# Patient Record
Sex: MALE | Race: BLACK OR AFRICAN AMERICAN
[De-identification: names, ages, dates, MRNs, and addresses within clinical notes are randomized per-mention and may not be internally consistent; named-entity substitution may affect disease eponyms.]

---

## 2018-08-29 ENCOUNTER — HOSPITAL ENCOUNTER (EMERGENCY)
Dept: HOSPITAL 62 - ER | Age: 23
Discharge: HOME | End: 2018-08-29
Payer: SELF-PAY

## 2018-08-29 VITALS — DIASTOLIC BLOOD PRESSURE: 46 MMHG | SYSTOLIC BLOOD PRESSURE: 102 MMHG

## 2018-08-29 DIAGNOSIS — R50.9: ICD-10-CM
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
## 2018-08-29 DIAGNOSIS — J02.9: Primary | ICD-10-CM

## 2018-08-29 DIAGNOSIS — R22.0: ICD-10-CM

## 2018-08-29 DIAGNOSIS — T78.40XA: ICD-10-CM

## 2018-08-29 PROCEDURE — 96375 TX/PRO/DX INJ NEW DRUG ADDON: CPT

## 2018-08-29 PROCEDURE — S0028 INJECTION, FAMOTIDINE, 20 MG: HCPCS

## 2018-08-29 PROCEDURE — 99283 EMERGENCY DEPT VISIT LOW MDM: CPT

## 2018-08-29 PROCEDURE — 96372 THER/PROPH/DIAG INJ SC/IM: CPT

## 2018-08-29 PROCEDURE — 96361 HYDRATE IV INFUSION ADD-ON: CPT

## 2018-08-29 PROCEDURE — 96374 THER/PROPH/DIAG INJ IV PUSH: CPT

## 2018-08-29 PROCEDURE — 87077 CULTURE AEROBIC IDENTIFY: CPT

## 2018-08-29 PROCEDURE — 87880 STREP A ASSAY W/OPTIC: CPT

## 2018-08-29 PROCEDURE — 87070 CULTURE OTHR SPECIMN AEROBIC: CPT

## 2018-08-29 NOTE — ER DOCUMENT REPORT
HPI





- HPI


Patient complains to provider of: Allergic reaction


Onset: Other - 3 days


Onset/Duration: Persistent


Quality of pain: No pain


Pain Level: Denies


Context: 





Patient states that he developed skin rash 3 days ago and gradually started to 

worsen and today he woke up with facial swelling that has continued throughout 

the day.  Patient complains of periorbital swelling and lip swelling.  Patient 

does complain of sore throat.  Patient denies any difficulty breathing or 

swallowing.  Patient denies any swelling to the tongue or throat.  Patient 

denies any new foods medications or detergents.


Associated Symptoms: Sore throat, Other - Facial swelling, skin rash.  denies: 

Nonproductive cough, Productive cough


Exacerbated by: Denies


Relieved by: Denies


Similar symptoms previously: No


Recently seen / treated by doctor: No





- ROS


ROS below otherwise negative: Yes


Systems Reviewed and Negative: Yes All other systems reviewed and negative





- CONSTITUTIONAL


Constitutional: REPORTS: Fever





- EENT


EENT: REPORTS: Sore Throat





- GASTROINTESTINAL


Gastrointestinal: DENIES: Nausea, Patient vomiting





- DERM


Skin Color: Normal


Skin Problems: Rash





Past Medical History





- General


Information source: Patient





- Social History


Smoking Status: Never Smoker


Frequency of alcohol use: None


Drug Abuse: None


Occupation: Call center


Family History: Reviewed & Not Pertinent





- Medical History


Medical History: Negative


Surgical Hx: Negative





Vertical Provider Document





- CONSTITUTIONAL


Agree With Documented VS: Yes


Exam Limitations: No Limitations


General Appearance: WD/WN, No Apparent Distress





- INFECTION CONTROL


TRAVEL OUTSIDE OF THE [place] IN LAST 30 DAYS: No





- HEENT


HEENT: Atraumatic, Normocephalic


Notes: 





Periorbital edema with trace swelling to lips.  No swelling to tongue or 

posterior pharynx, no potential airway compromise.  Patient managing oral 

secretions.





- NECK


Neck: Normal Inspection, Supple





- RESPIRATORY


Respiratory: Breath Sounds Normal, No Respiratory Distress





- CARDIOVASCULAR


Cardiovascular: Regular Rate, Regular Rhythm, No Murmur





- GI/ABDOMEN


Gastrointestinal: Abdomen Soft, Abdomen Non-Tender, No Organomegaly





- MUSCULOSKELETAL/EXTREMETIES


Musculoskeletal/Extremeties: MAEW, FROM





- NEURO


Level of Consciousness: Awake, Alert, Appropriate


Motor/Sensory: No Motor Deficit





- DERM


Integumentary: Warm, Dry, Rash - Urticarial rash to face trunk and extremities





Course





- Re-evaluation


Re-evalutation: 





08/29/18 16:53


Patient with subtle decrease in swelling to the upper lip and periorbital area.

  Patient states he feels as though swelling is improved at this time.  No 

respiratory distress.  No airway compromise.


08/29/18 19:25


No additional facial swelling, no potential airway compromise.  Patient states 

he is feeling much better.  Good return precautions given.





- Vital Signs


Vital signs: 


 











Temp Pulse Resp BP Pulse Ox


 


 100.6 F H  101 H  14   120/57 L  96 


 


 08/29/18 14:52  08/29/18 14:52  08/29/18 14:52  08/29/18 14:52  08/29/18 14:52














Discharge





- Discharge


Clinical Impression: 


 Sore throat





Allergic reaction


Qualifiers:


 Encounter type: initial encounter Qualified Code(s): T78.40XA - Allergy, 

unspecified, initial encounter





Fever


Qualifiers:


 Fever type: unspecified Qualified Code(s): R50.9 - Fever, unspecified





Condition: Stable


Disposition: HOME, SELF-CARE


Instructions:  Fever ([facility]), Sore Throat ([facility])


Additional Instructions: 


Return immediately for any new or worsening symptoms





Followup with your primary care provider, call tomorrow to make a followup 

appointment





Throat culture is pending, we will call if you need any different treatment





ACUTE ALLERGIC REACTION:





     Your symptoms are due to an allergic reaction.  Allergy can cause hives, 

swelling of the hands, feet, and face, hoarseness, and difficulty swallowing or 

breathing.  It may be due to exposure to medication, animal dander, foods, 

infection, or insect bites. Medication is a common cause, even when prior use 

of this same medication caused no problems.


     Acute treatment may include adrenalin and antihistamines. Usually, the 

specific allergic agent can't be identified unless repeated episodes occur.


     Home treatment includes the following: 


(1) Stop any suspicious medications.  This will be discussed with you.  


(2) Oral antihistamines for the next four to five days. Example, 

diphenhydramine (Benadryl) every four hours.


(3) You may also use cimetidine (Tagamet), ranitidine (Zantac), or famotidine (

Pepcid) every four hours if diphenhydramine is not controlling itching and 

hives. 


(4) Avoid aspirin until the hives completely disappear.  


(5) Avoid hot baths or showers until the hives are completely gone.


     Call the doctor if faintness, difficulty swallowing, tightness in the chest

, or wheezing occurs.








EPINEPHRINE:


     An injection of epinephrine (also called adrenalin) is used to treat 

allergic reactions, asthma, and some other medical conditions.  It is a 

stimulant medication that consticts blood vessels, relaxes smooth muscles such 

as in the bronchioles of the lung, elevates blood pressure, and increases heart 

rate.  It can temporarily make you feel very nervous and shakey, but it's 

affects last only a short time, about 15 to 30 minutes at most.








STEROID MEDICATION INJECTION:


     You have been given an injection of medicine of the cortisone/steroid 

class.  This medication is used to control inflammation or allergy.  It is 

often continued as a pill for a short period of time, until the acute process 

subsides.


     There are usually no side effects from short-term use of cortisone-like 

medications.  Some persons feel an increased sense of well-being and are not 

sleepy at bedtime.  Long-term use of cortisone medications is best avoided, 

unless required for a severe condition.  If your condition does not remit, or 

relapses after the course of corticosteroid medication, you should consult your 

physician.








STEROID MEDICATION:


     You have been given a medicine of the cortisone/steroid class.  This 

medication is used to control inflammation or allergy.  It is usually only 

given for a short period of time, until the acute process subsides.


     There are usually no side effects from short-term use of cortisone-like 

medications.  Some persons feel an increased sense of well-being and are not 

sleepy at bedtime.  Long-term use of cortisone medications is best avoided, 

unless required for a severe condition.  If your condition does not remit, or 

relapses after the course of corticosteroid medication, you should consult your 

physician.








ACID-SUPPRESSING MEDICATION:


     You have a prescription for medicine which reduces the stomach's secretion 

of acid.  Examples include Zantac, Tagament, and Pepcid.  These drugs are often 

used to allow healing of ulcers or esophagitis.  They may be needed to prevent 

recurrence of ulcers in some patients, or to prevent damage from acid reflux in 

the esophagus.


     Take all medication as prescribed, even after the pain is gone. Regular 

antacids may be added as needed if you have symptoms while taking this medicine.


     These medications sometimes are prescribed for allergic reactions because 

they have anti-histaminic effects and relieve the rash and itching of the 

reaction.


     There are usually no side effects from this medication.  But, in rare 

cases and particularly in the elderly, serious problems can occur. Contact your 

doctor if there is fever, rash, hallucinations, confusion, or unusual bruising.


     Contact your doctor at once if you develop lightheadedness, black or 

bloody stool, or bloody vomitus.








ANTIHISTAMINES:


     An antihistamine has been given and/or prescribed to control your 

symptoms. Antihistamines are used for many reasons, including itching, watering 

eyes, runny nose, allergic swelling, hives, and insect stings.


     Antihistamines may cause drowsiness, especially with the first dose.  Do 

not operate machinery or drive while under the effects of the medication.  

Other common side effects include dry mouth and eyes.  In older persons, 

antihistamines can occasionally cause urinary retention, constipation, and 

trouble focusing the eyes.


     Do not combine the medication with alcohol, or with any other medication 

without talking to your doctor.








USE OF DIPHENHYDRAMINE:


     The use of diphenhydramine (Benadryl) has been recommended to control 

allergic symptoms.  The 25 mg strength is available over- the-counter, as well 

as the elixir.  This antihistamine is used for many symptoms.  It's useful for 

itching, watering eyes and nose, allergic swelling, hives, and insect stings.  

The medication can be repeated four times daily.


     Age         Elixir (12.5 mg/tsp)         25 mg pill


     2-3 yr      1/2 tsp


     4-8 yr      1 tsp


     9-14 yr     2 tsp                        one tab


     adult                                    1-2 tabs


     Antihistamines may cause drowsiness, especially with the first dose.  Do 

not operate machinery or drive while under the effects of the medication.  Do 

not combine the medication with alcohol, or with any other medication without 

talking to your doctor.








FOLLOW-UP CARE:


If you have been referred to a physician for follow-up care, call the physician

s office for an appointment as you were instructed or within the next two days.

  If you experience worsening or a significant change in your symptoms, notify 

the physician immediately or return to the Emergency Department at any time for 

re-evaluation.





Prescriptions: 


Epinephrine [Epipen 2-Mathieu] 0.3 mg IM ASDIR PRN #1 unit


 PRN Reason: 


Famotidine [Pepcid 20 mg Tablet] 20 mg PO BID #12 tablet


Prednisone [Deltasone 20 mg Tablet] 3 tab PO DAILY 5 Days  tablet


Forms:  Return to Work


Referrals: 


[facility] [Provider Group] - Follow up as needed